# Patient Record
Sex: MALE | Race: BLACK OR AFRICAN AMERICAN | URBAN - METROPOLITAN AREA
[De-identification: names, ages, dates, MRNs, and addresses within clinical notes are randomized per-mention and may not be internally consistent; named-entity substitution may affect disease eponyms.]

---

## 2017-11-21 ENCOUNTER — ED HISTORICAL/CONVERTED ENCOUNTER (OUTPATIENT)
Dept: OTHER | Age: 42
End: 2017-11-21

## 2017-12-05 ENCOUNTER — ED HISTORICAL/CONVERTED ENCOUNTER (OUTPATIENT)
Dept: OTHER | Age: 42
End: 2017-12-05

## 2018-06-22 ENCOUNTER — ED HISTORICAL/CONVERTED ENCOUNTER (OUTPATIENT)
Dept: OTHER | Age: 43
End: 2018-06-22

## 2019-06-22 ENCOUNTER — ED HISTORICAL/CONVERTED ENCOUNTER (OUTPATIENT)
Dept: OTHER | Age: 44
End: 2019-06-22

## 2025-07-01 ENCOUNTER — HOSPITAL ENCOUNTER (EMERGENCY)
Facility: HOSPITAL | Age: 50
Discharge: HOME | End: 2025-07-01
Attending: EMERGENCY MEDICINE

## 2025-07-01 ENCOUNTER — APPOINTMENT (OUTPATIENT)
Facility: HOSPITAL | Age: 50
End: 2025-07-01

## 2025-07-01 VITALS
HEIGHT: 68 IN | TEMPERATURE: 97.3 F | RESPIRATION RATE: 16 BRPM | SYSTOLIC BLOOD PRESSURE: 150 MMHG | HEART RATE: 82 BPM | OXYGEN SATURATION: 97 % | WEIGHT: 193 LBS | DIASTOLIC BLOOD PRESSURE: 125 MMHG | BODY MASS INDEX: 29.25 KG/M2

## 2025-07-01 DIAGNOSIS — R07.89 ATYPICAL CHEST PAIN: ICD-10-CM

## 2025-07-01 DIAGNOSIS — K21.9 GASTROESOPHAGEAL REFLUX DISEASE WITHOUT ESOPHAGITIS: ICD-10-CM

## 2025-07-01 DIAGNOSIS — R10.13 ABDOMINAL PAIN, EPIGASTRIC: Primary | ICD-10-CM

## 2025-07-01 LAB
ALBUMIN SERPL-MCNC: 3.7 G/DL (ref 3.5–5)
ALBUMIN/GLOB SERPL: 1 (ref 1.1–2.2)
ALP SERPL-CCNC: 77 U/L (ref 45–117)
ALT SERPL-CCNC: 34 U/L (ref 12–78)
ANION GAP SERPL CALC-SCNC: 7 MMOL/L (ref 2–12)
AST SERPL W P-5'-P-CCNC: 22 U/L (ref 15–37)
BASOPHILS # BLD: 0.06 K/UL (ref 0–0.1)
BASOPHILS NFR BLD: 1 % (ref 0–1)
BILIRUB SERPL-MCNC: 0.2 MG/DL (ref 0.2–1)
BUN SERPL-MCNC: 13 MG/DL (ref 6–20)
BUN/CREAT SERPL: 10 (ref 12–20)
CA-I BLD-MCNC: 9.3 MG/DL (ref 8.5–10.1)
CHLORIDE SERPL-SCNC: 108 MMOL/L (ref 97–108)
CO2 SERPL-SCNC: 27 MMOL/L (ref 21–32)
CREAT SERPL-MCNC: 1.33 MG/DL (ref 0.7–1.3)
DIFFERENTIAL METHOD BLD: ABNORMAL
EOSINOPHIL # BLD: 0.43 K/UL (ref 0–0.4)
EOSINOPHIL NFR BLD: 7.3 % (ref 0–7)
ERYTHROCYTE [DISTWIDTH] IN BLOOD BY AUTOMATED COUNT: 14.5 % (ref 11.5–14.5)
GLOBULIN SER CALC-MCNC: 3.7 G/DL (ref 2–4)
GLUCOSE SERPL-MCNC: 93 MG/DL (ref 65–100)
HCT VFR BLD AUTO: 47.5 % (ref 36.6–50.3)
HGB BLD-MCNC: 15.2 G/DL (ref 12.1–17)
IMM GRANULOCYTES # BLD AUTO: 0.01 K/UL (ref 0–0.04)
IMM GRANULOCYTES NFR BLD AUTO: 0.2 % (ref 0–0.5)
LIPASE SERPL-CCNC: 86 U/L (ref 13–75)
LYMPHOCYTES # BLD: 1.9 K/UL (ref 0.8–3.5)
LYMPHOCYTES NFR BLD: 32.4 % (ref 12–49)
MCH RBC QN AUTO: 27.8 PG (ref 26–34)
MCHC RBC AUTO-ENTMCNC: 32 G/DL (ref 30–36.5)
MCV RBC AUTO: 87 FL (ref 80–99)
MONOCYTES # BLD: 0.67 K/UL (ref 0–1)
MONOCYTES NFR BLD: 11.4 % (ref 5–13)
NEUTS SEG # BLD: 2.79 K/UL (ref 1.8–8)
NEUTS SEG NFR BLD: 47.7 % (ref 32–75)
NRBC # BLD: 0 K/UL (ref 0–0.01)
NRBC BLD-RTO: 0 PER 100 WBC
PLATELET # BLD AUTO: 267 K/UL (ref 150–400)
PMV BLD AUTO: 9.9 FL (ref 8.9–12.9)
POTASSIUM SERPL-SCNC: 4.3 MMOL/L (ref 3.5–5.1)
PROT SERPL-MCNC: 7.4 G/DL (ref 6.4–8.2)
RBC # BLD AUTO: 5.46 M/UL (ref 4.1–5.7)
SODIUM SERPL-SCNC: 142 MMOL/L (ref 136–145)
TROPONIN I SERPL HS-MCNC: 10 NG/L (ref 0–76)
WBC # BLD AUTO: 5.9 K/UL (ref 4.1–11.1)

## 2025-07-01 PROCEDURE — 71045 X-RAY EXAM CHEST 1 VIEW: CPT

## 2025-07-01 PROCEDURE — 85025 COMPLETE CBC W/AUTO DIFF WBC: CPT

## 2025-07-01 PROCEDURE — 6370000000 HC RX 637 (ALT 250 FOR IP): Performed by: EMERGENCY MEDICINE

## 2025-07-01 PROCEDURE — 36415 COLL VENOUS BLD VENIPUNCTURE: CPT

## 2025-07-01 PROCEDURE — 84484 ASSAY OF TROPONIN QUANT: CPT

## 2025-07-01 PROCEDURE — 83690 ASSAY OF LIPASE: CPT

## 2025-07-01 PROCEDURE — 93005 ELECTROCARDIOGRAM TRACING: CPT | Performed by: EMERGENCY MEDICINE

## 2025-07-01 PROCEDURE — 99285 EMERGENCY DEPT VISIT HI MDM: CPT

## 2025-07-01 PROCEDURE — 80053 COMPREHEN METABOLIC PANEL: CPT

## 2025-07-01 RX ORDER — MAGNESIUM HYDROXIDE/ALUMINUM HYDROXICE/SIMETHICONE 120; 1200; 1200 MG/30ML; MG/30ML; MG/30ML
30 SUSPENSION ORAL
Status: COMPLETED | OUTPATIENT
Start: 2025-07-01 | End: 2025-07-01

## 2025-07-01 RX ORDER — LIDOCAINE HYDROCHLORIDE 20 MG/ML
15 SOLUTION OROPHARYNGEAL
Status: COMPLETED | OUTPATIENT
Start: 2025-07-01 | End: 2025-07-01

## 2025-07-01 RX ORDER — ONDANSETRON 4 MG/1
4 TABLET, ORALLY DISINTEGRATING ORAL EVERY 8 HOURS PRN
Qty: 20 TABLET | Refills: 0 | Status: SHIPPED | OUTPATIENT
Start: 2025-07-01 | End: 2025-07-08

## 2025-07-01 RX ORDER — OMEPRAZOLE 20 MG/1
40 CAPSULE, DELAYED RELEASE ORAL DAILY
Qty: 60 CAPSULE | Refills: 0 | Status: SHIPPED | OUTPATIENT
Start: 2025-07-01 | End: 2025-07-31

## 2025-07-01 RX ORDER — FAMOTIDINE 20 MG/1
40 TABLET, FILM COATED ORAL ONCE
Status: COMPLETED | OUTPATIENT
Start: 2025-07-01 | End: 2025-07-01

## 2025-07-01 RX ADMIN — LIDOCAINE HYDROCHLORIDE 15 ML: 20 SOLUTION ORAL at 09:29

## 2025-07-01 RX ADMIN — ALUMINUM HYDROXIDE, MAGNESIUM HYDROXIDE, AND SIMETHICONE 30 ML: 200; 200; 20 SUSPENSION ORAL at 09:29

## 2025-07-01 RX ADMIN — FAMOTIDINE 40 MG: 20 TABLET, FILM COATED ORAL at 09:30

## 2025-07-01 ASSESSMENT — PAIN - FUNCTIONAL ASSESSMENT: PAIN_FUNCTIONAL_ASSESSMENT: 0-10

## 2025-07-01 ASSESSMENT — PAIN SCALES - GENERAL
PAINLEVEL_OUTOF10: 5
PAINLEVEL_OUTOF10: 0
PAINLEVEL_OUTOF10: 0

## 2025-07-01 ASSESSMENT — LIFESTYLE VARIABLES
HOW MANY STANDARD DRINKS CONTAINING ALCOHOL DO YOU HAVE ON A TYPICAL DAY: PATIENT DECLINED
HOW OFTEN DO YOU HAVE A DRINK CONTAINING ALCOHOL: PATIENT DECLINED

## 2025-07-01 ASSESSMENT — PAIN DESCRIPTION - LOCATION: LOCATION: ABDOMEN

## 2025-07-01 ASSESSMENT — HEART SCORE: ECG: NORMAL

## 2025-07-01 NOTE — DISCHARGE INSTRUCTIONS
Thank you for choosing our Emergency Department for your care.  It is our privilege to care for you in your time of need.  In the next several days, you may receive a survey via email or mailed to your home about your experience with our team.  We would greatly appreciate you taking a few minutes to complete the survey, as we use this information to learn what we have done well and what we could be doing better. Thank you for trusting us with your care!    Below you will find a list of your tests from today's visit.   Labs and Radiology Studies  Recent Results (from the past 12 hours)   EKG 12 Lead    Collection Time: 07/01/25  9:00 AM   Result Value Ref Range    Ventricular Rate 82 BPM    Atrial Rate 83 BPM    P-R Interval 141 ms    QRS Duration 73 ms    Q-T Interval 371 ms    QTc Calculation (Bazett) 434 ms    P Axis 67 degrees    R Axis 57 degrees    T Axis 78 degrees    Diagnosis       Sinus rhythm  Nonspecific T abnormalities, lateral leads  Minimal ST elevation, anterior leads  Baseline wander in lead(s) II,III,aVR,aVF     CBC with Auto Differential    Collection Time: 07/01/25  9:09 AM   Result Value Ref Range    WBC 5.9 4.1 - 11.1 K/uL    RBC 5.46 4.10 - 5.70 M/uL    Hemoglobin 15.2 12.1 - 17.0 g/dL    Hematocrit 47.5 36.6 - 50.3 %    MCV 87.0 80.0 - 99.0 FL    MCH 27.8 26.0 - 34.0 PG    MCHC 32.0 30.0 - 36.5 g/dL    RDW 14.5 11.5 - 14.5 %    Platelets 267 150 - 400 K/uL    MPV 9.9 8.9 - 12.9 FL    Nucleated RBCs 0.0 0.0  WBC    nRBC 0.00 0.00 - 0.01 K/uL    Neutrophils % 47.7 32.0 - 75.0 %    Lymphocytes % 32.4 12.0 - 49.0 %    Monocytes % 11.4 5.0 - 13.0 %    Eosinophils % 7.3 (H) 0.0 - 7.0 %    Basophils % 1.0 0.0 - 1.0 %    Immature Granulocytes % 0.2 0 - 0.5 %    Neutrophils Absolute 2.79 1.80 - 8.00 K/UL    Lymphocytes Absolute 1.90 0.80 - 3.50 K/UL    Monocytes Absolute 0.67 0.00 - 1.00 K/UL    Eosinophils Absolute 0.43 (H) 0.00 - 0.40 K/UL    Basophils Absolute 0.06 0.00 - 0.10 K/UL

## 2025-07-01 NOTE — ED TRIAGE NOTES
Patient reports intermittent epigastric pain that has been intermittent x 1 year patient has not seen a PCP for same reports every time he eats it gets worse patient has been taking Rolaids for same but they no longer relieve his discomfort

## 2025-07-02 LAB
EKG ATRIAL RATE: 83 BPM
EKG DIAGNOSIS: NORMAL
EKG P AXIS: 67 DEGREES
EKG P-R INTERVAL: 141 MS
EKG Q-T INTERVAL: 371 MS
EKG QRS DURATION: 73 MS
EKG QTC CALCULATION (BAZETT): 434 MS
EKG R AXIS: 57 DEGREES
EKG T AXIS: 78 DEGREES
EKG VENTRICULAR RATE: 82 BPM

## 2025-07-03 NOTE — ED PROVIDER NOTES
[facility] EMERGENCY DEPT  EMERGENCY DEPARTMENT HISTORY AND PHYSICAL EXAM      Date of evaluation: 7/1/2025  Patient Name: Andrew Schrader  Birthdate 1975  MRN: [id number]  ED Provider: Dallas Millan MD   Note Started: 7:36 AM EDT 7/3/25    HISTORY OF PRESENT ILLNESS     Chief Complaint   Patient presents with    Abdominal Pain       History Provided By: Patient, only     HPI: Andrew Schrader is a 49 y.o. male This note was generated using [employer] AI software. If any parties were recorded to generate this note outside of the software user, their consent was obtained prior to recording their voices. All portions of this note were verified by the clinician prior to inclusion in the medical record.    Chief Complaint  - Intermittent epigastric pain for the past year, worsened recently  - \"Now it's gotten worse until I can't continue\"  - Pain occurs after eating, at night when lying down, and while driving  - Burning sensation in epigastric area  - Slight headache associated with epigastric pain    History of Present Illness  Mr. Clement, a 49-year-old male , presents to the ED with intermittent epigastric pain that has been ongoing for the past year but has recently worsened. The pain typically occurs after eating, when lying down at night, or while driving. He describes it as a burning sensation that rises up to his chest, possibly related to acid reflux. The patient has been using Rolaids for symptom relief, but they are no longer effective. Yesterday, he took a whole pack of Rolaids, which only provided relief for about 2 minutes before the pain returned.    The patient reports associated symptoms of slight headaches. He has tried various over-the-counter medications for acid relief, but with limited success. His diet on the road has included fast foods like pizza, though he has recently attempted to eat lighter foods such as tuna fish. Mr. Clement acknowledges that his alcohol  visualized and preliminarily interpreted by the ED Provider with the following findings: Xray Interpreted by me.  Shows no effusions no pneumonia no cardiomegaly.     Interpretation per the Radiologist below, if available at the time of this note:  XR CHEST PORTABLE   Final Result      No acute process.         Electronically signed by Alexis Chery           MEDICAL DECISION MAKING and ED COURSE   7:36 AM Differential and Considerations of tests not ordered: Patient with history as above presenting with intermittent epigastric pain for the past year, worsening recently. Differential includes GERD vs PUD vs cardiac etiology. Plan includes EKG, CBC, CMP, lipase, PO meds for symptom relief, and patient education on lifestyle modifications including dietary changes, smoking cessation, and alcohol reduction.    Addendums/ED Course  Workup reveals a mildly elevated lipase and creatinine.  Discussed with the patient hydration alcohol cessation.  Follow-up with primary doctor to check cholesterol.  He does feel better with treatment in the ED.  Will recommend a regular PPI    Clinical Decision Making Tools  Heart score of 2    Review of External Records  None    Independent Historians  History obtained from or confirmed by patient.      Discussions with Other Healthcare Team Members  None    Social Determinants of Health (SODH)  Alcohol abuse. Provided counseling and discussed risks of substance abuse.    Critical Care  None     Vitals:    Vitals:    07/01/25 0908 07/01/25 0915 07/01/25 0945 07/01/25 1000   BP: (!) 146/91  (!) 157/108 (!) 150/125   Pulse: 82      Resp: 16      Temp: 98.1 °F (36.7 °C) 97.3 °F (36.3 °C)     SpO2: 99%  97% 97%   Weight: 87.5 kg (193 lb)      Height: 1.727 m (5' 8\")          ED COURSE  ED Course as of 07/03/25 0736   Tue Jul 01, 2025   0922 EKG as interpreted by me sinus rhythm 82 bpm there is to be or ectopy present [MC]      ED Course User Index  [MC] Dallas Millan MD